# Patient Record
Sex: FEMALE | Race: OTHER | NOT HISPANIC OR LATINO
[De-identification: names, ages, dates, MRNs, and addresses within clinical notes are randomized per-mention and may not be internally consistent; named-entity substitution may affect disease eponyms.]

---

## 2019-04-17 ENCOUNTER — RESULT REVIEW (OUTPATIENT)
Age: 69
End: 2019-04-17

## 2021-01-25 ENCOUNTER — APPOINTMENT (OUTPATIENT)
Dept: OPHTHALMOLOGY | Facility: CLINIC | Age: 71
End: 2021-01-25

## 2021-01-25 ENCOUNTER — NON-APPOINTMENT (OUTPATIENT)
Age: 71
End: 2021-01-25

## 2021-06-01 ENCOUNTER — NON-APPOINTMENT (OUTPATIENT)
Age: 71
End: 2021-06-01

## 2021-06-01 ENCOUNTER — APPOINTMENT (OUTPATIENT)
Dept: OPHTHALMOLOGY | Facility: CLINIC | Age: 71
End: 2021-06-01

## 2021-08-03 ENCOUNTER — NON-APPOINTMENT (OUTPATIENT)
Age: 71
End: 2021-08-03

## 2021-08-03 ENCOUNTER — APPOINTMENT (OUTPATIENT)
Dept: OPHTHALMOLOGY | Facility: CLINIC | Age: 71
End: 2021-08-03

## 2021-11-16 ENCOUNTER — APPOINTMENT (OUTPATIENT)
Dept: OPHTHALMOLOGY | Facility: CLINIC | Age: 71
End: 2021-11-16

## 2021-11-16 ENCOUNTER — NON-APPOINTMENT (OUTPATIENT)
Age: 71
End: 2021-11-16

## 2022-03-09 ENCOUNTER — APPOINTMENT (OUTPATIENT)
Dept: OPHTHALMOLOGY | Facility: CLINIC | Age: 72
End: 2022-03-09

## 2022-03-15 ENCOUNTER — APPOINTMENT (OUTPATIENT)
Dept: OPHTHALMOLOGY | Facility: CLINIC | Age: 72
End: 2022-03-15

## 2022-03-15 ENCOUNTER — OUTPATIENT (OUTPATIENT)
Dept: OUTPATIENT SERVICES | Facility: HOSPITAL | Age: 72
LOS: 1 days | End: 2022-03-15

## 2022-03-15 ENCOUNTER — NON-APPOINTMENT (OUTPATIENT)
Age: 72
End: 2022-03-15

## 2022-03-16 DIAGNOSIS — H26.9 UNSPECIFIED CATARACT: ICD-10-CM

## 2022-03-29 ENCOUNTER — APPOINTMENT (OUTPATIENT)
Dept: OPHTHALMOLOGY | Facility: CLINIC | Age: 72
End: 2022-03-29

## 2022-03-29 ENCOUNTER — NON-APPOINTMENT (OUTPATIENT)
Age: 72
End: 2022-03-29

## 2022-04-12 NOTE — ASU PATIENT PROFILE, ADULT - FALL HARM RISK - UNIVERSAL INTERVENTIONS
Bed in lowest position, wheels locked, appropriate side rails in place/Call bell, personal items and telephone in reach/Instruct patient to call for assistance before getting out of bed or chair/Non-slip footwear when patient is out of bed/[place] to call system/Physically safe environment - no spills, clutter or unnecessary equipment/Purposeful Proactive Rounding/Room/bathroom lighting operational, light cord in reach

## 2022-04-13 ENCOUNTER — TRANSCRIPTION ENCOUNTER (OUTPATIENT)
Age: 72
End: 2022-04-13

## 2022-04-13 ENCOUNTER — NON-APPOINTMENT (OUTPATIENT)
Age: 72
End: 2022-04-13

## 2022-04-13 ENCOUNTER — OUTPATIENT (OUTPATIENT)
Dept: OUTPATIENT SERVICES | Facility: HOSPITAL | Age: 72
LOS: 1 days | Discharge: HOME | End: 2022-04-13

## 2022-04-13 ENCOUNTER — APPOINTMENT (OUTPATIENT)
Dept: OPHTHALMOLOGY | Facility: AMBULATORY SURGERY CENTER | Age: 72
End: 2022-04-13

## 2022-04-13 VITALS
HEIGHT: 63 IN | HEART RATE: 70 BPM | TEMPERATURE: 98 F | SYSTOLIC BLOOD PRESSURE: 160 MMHG | OXYGEN SATURATION: 99 % | RESPIRATION RATE: 16 BRPM | WEIGHT: 155.21 LBS | DIASTOLIC BLOOD PRESSURE: 57 MMHG

## 2022-04-13 VITALS
RESPIRATION RATE: 16 BRPM | SYSTOLIC BLOOD PRESSURE: 139 MMHG | TEMPERATURE: 98 F | HEART RATE: 69 BPM | DIASTOLIC BLOOD PRESSURE: 62 MMHG | OXYGEN SATURATION: 97 %

## 2022-04-13 DIAGNOSIS — Z98.890 OTHER SPECIFIED POSTPROCEDURAL STATES: Chronic | ICD-10-CM

## 2022-04-13 DIAGNOSIS — N60.02 SOLITARY CYST OF LEFT BREAST: Chronic | ICD-10-CM

## 2022-04-13 DEVICE — LENS IOL ACRYSOF SN60WF 20.5D
Type: IMPLANTABLE DEVICE | Site: LEFT | Status: NON-FUNCTIONAL
Removed: 2022-04-13

## 2022-04-13 RX ORDER — TROPICAMIDE 1 %
1 DROPS OPHTHALMIC (EYE)
Refills: 0 | Status: COMPLETED | OUTPATIENT
Start: 2022-04-13 | End: 2022-04-13

## 2022-04-13 RX ORDER — PHENYLEPHRINE HCL 2.5 %
1 DROPS OPHTHALMIC (EYE)
Refills: 0 | Status: COMPLETED | OUTPATIENT
Start: 2022-04-13 | End: 2022-04-13

## 2022-04-13 RX ORDER — ACETAMINOPHEN 500 MG
650 TABLET ORAL ONCE
Refills: 0 | Status: DISCONTINUED | OUTPATIENT
Start: 2022-04-13 | End: 2022-04-13

## 2022-04-13 RX ORDER — CYCLOPENTOLATE HYDROCHLORIDE 10 MG/ML
1 SOLUTION/ DROPS OPHTHALMIC
Refills: 0 | Status: COMPLETED | OUTPATIENT
Start: 2022-04-13 | End: 2022-04-13

## 2022-04-13 RX ORDER — OFLOXACIN 0.3 %
1 DROPS OPHTHALMIC (EYE)
Refills: 0 | Status: COMPLETED | OUTPATIENT
Start: 2022-04-13 | End: 2022-04-13

## 2022-04-13 RX ADMIN — Medication 1 DROP(S): at 09:41

## 2022-04-13 RX ADMIN — Medication 1 DROP(S): at 09:39

## 2022-04-13 RX ADMIN — Medication 1 DROP(S): at 09:15

## 2022-04-13 RX ADMIN — Medication 1 DROP(S): at 09:16

## 2022-04-13 RX ADMIN — Medication 1 DROP(S): at 09:14

## 2022-04-13 RX ADMIN — CYCLOPENTOLATE HYDROCHLORIDE 1 DROP(S): 10 SOLUTION/ DROPS OPHTHALMIC at 09:38

## 2022-04-13 RX ADMIN — Medication 1 DROP(S): at 09:40

## 2022-04-13 RX ADMIN — CYCLOPENTOLATE HYDROCHLORIDE 1 DROP(S): 10 SOLUTION/ DROPS OPHTHALMIC at 09:40

## 2022-04-13 RX ADMIN — CYCLOPENTOLATE HYDROCHLORIDE 1 DROP(S): 10 SOLUTION/ DROPS OPHTHALMIC at 09:15

## 2022-04-13 NOTE — ASU DISCHARGE PLAN (ADULT/PEDIATRIC) - NS MD DC FALL RISK RISK
For information on Fall & Injury Prevention, visit: [email or web address] OR  [email or web address] OR  https://www.cdc.gov/steadi/patient.html

## 2022-04-13 NOTE — OPERATIVE REPORT - OPERATIVE RPOSRT DETAILS
DATE: 4/13/2022    ATTENDING: Steffi Kellogg MD    PREOPERATIVE DIAGNOSIS(ES):  Cataract, Left eye.    POSTOPERATIVE DIAGNOSIS(ES):  Cataract, Left eye.    OPERATION:  Phacoemulsification with posterior chamber lens implant, SN60 WF +20.5 D Left eye.    COMPLICATIONS:  None.    ESTIMATED BLOOD LOSS: <1 cc    SPECIMEN/TISSUE REMOVED: None    DESCRIPTION OF PROCEDURE:  The patient was brought to the operating room and the patient was prepped and draped in the usual sterile fashion, including Betadine drops in the eye.  A drop of topical anesthetic was placed in the eye.  Steridrapes were used to cover the lashes and a  lid speculum was placed between the lids.  A superblade and Pratt ring were used to create paracenteses at the 3 and 9 oclock positions.  Intracameral preservative free lidocaine and epinephrine were instilled followed by a Viscoat.  A clear cornea superior incision was created using a 2.6mm metal bladed keratome.  A continuous curvilinear capsulorhexis was started with a 27 prebent cystotome and completed with capsulorhexis forceps.  The lens was hydrodissected with BSS.  The lens was then phacoemulsified using a divide and conquer technique.  Residual cortical material was removed using automated irrigation and aspiration.  The capsular bag was reformed using a viscoelastic.  A ________ lens was inserted into the bag and the superior haptic was gently dunked beneath the anterior capsule using a straight sinsky hook and rotated into place.  Symmetric capsular bag fixation was confirmed.  The remaining viscoelastic was removed with automated irrigation and aspiration.  The wounds were hydrated and checked to be water tight. The eye was left at a normotensive pressure  The lid speculum was removed.  Tobradex drops and ointment were instilled in the eye.  The patient left the OR in stable condition having tolerated the procedure well.  I, Steffi Kellogg, was present throughout the case.

## 2022-04-14 ENCOUNTER — APPOINTMENT (OUTPATIENT)
Dept: OPHTHALMOLOGY | Facility: CLINIC | Age: 72
End: 2022-04-14

## 2022-04-14 ENCOUNTER — NON-APPOINTMENT (OUTPATIENT)
Age: 72
End: 2022-04-14

## 2022-04-21 PROBLEM — I10 ESSENTIAL (PRIMARY) HYPERTENSION: Chronic | Status: ACTIVE | Noted: 2022-04-13

## 2022-04-21 PROBLEM — M85.80 OTHER SPECIFIED DISORDERS OF BONE DENSITY AND STRUCTURE, UNSPECIFIED SITE: Chronic | Status: ACTIVE | Noted: 2022-04-13

## 2022-04-21 PROBLEM — E78.5 HYPERLIPIDEMIA, UNSPECIFIED: Chronic | Status: ACTIVE | Noted: 2022-04-13

## 2022-04-21 PROBLEM — J30.2 OTHER SEASONAL ALLERGIC RHINITIS: Chronic | Status: ACTIVE | Noted: 2022-04-13

## 2022-04-25 RX ORDER — SODIUM CHLORIDE 9 MG/ML
1000 INJECTION, SOLUTION INTRAVENOUS
Refills: 0 | Status: DISCONTINUED | OUTPATIENT
Start: 2022-04-26 | End: 2022-04-26

## 2022-04-25 NOTE — ASU PATIENT PROFILE, ADULT - NSICDXPASTMEDICALHX_GEN_ALL_CORE_FT
no sig carter/std
PAST MEDICAL HISTORY:  Hyperlipidemia     Hypertension     Osteopenia     Seasonal allergies

## 2022-04-25 NOTE — ASU PATIENT PROFILE, ADULT - FALL HARM RISK - UNIVERSAL INTERVENTIONS
Bed in lowest position, wheels locked, appropriate side rails in place/Call bell, personal items and telephone in reach/Instruct patient to call for assistance before getting out of bed or chair/Non-slip footwear when patient is out of bed/[place] to call system/Physically safe environment - no spills, clutter or unnecessary equipment/Purposeful Proactive Rounding/Room/bathroom lighting operational, light cord in reach

## 2022-04-26 ENCOUNTER — TRANSCRIPTION ENCOUNTER (OUTPATIENT)
Age: 72
End: 2022-04-26

## 2022-04-26 ENCOUNTER — NON-APPOINTMENT (OUTPATIENT)
Age: 72
End: 2022-04-26

## 2022-04-26 ENCOUNTER — OUTPATIENT (OUTPATIENT)
Dept: OUTPATIENT SERVICES | Facility: HOSPITAL | Age: 72
LOS: 1 days | Discharge: HOME | End: 2022-04-26

## 2022-04-26 ENCOUNTER — APPOINTMENT (OUTPATIENT)
Dept: OPHTHALMOLOGY | Facility: AMBULATORY SURGERY CENTER | Age: 72
End: 2022-04-26

## 2022-04-26 VITALS
TEMPERATURE: 98 F | HEART RATE: 73 BPM | RESPIRATION RATE: 16 BRPM | SYSTOLIC BLOOD PRESSURE: 131 MMHG | OXYGEN SATURATION: 99 % | DIASTOLIC BLOOD PRESSURE: 62 MMHG

## 2022-04-26 VITALS
OXYGEN SATURATION: 99 % | SYSTOLIC BLOOD PRESSURE: 168 MMHG | HEIGHT: 63 IN | HEART RATE: 69 BPM | RESPIRATION RATE: 16 BRPM | TEMPERATURE: 98 F | DIASTOLIC BLOOD PRESSURE: 74 MMHG | WEIGHT: 154.32 LBS

## 2022-04-26 DIAGNOSIS — Z98.890 OTHER SPECIFIED POSTPROCEDURAL STATES: Chronic | ICD-10-CM

## 2022-04-26 DIAGNOSIS — N60.02 SOLITARY CYST OF LEFT BREAST: Chronic | ICD-10-CM

## 2022-04-26 DEVICE — LENS IOL ACRYSOF SN60WF 22.0D
Type: IMPLANTABLE DEVICE | Site: RIGHT | Status: NON-FUNCTIONAL
Removed: 2022-04-26

## 2022-04-26 RX ORDER — ATORVASTATIN CALCIUM 80 MG/1
1 TABLET, FILM COATED ORAL
Qty: 0 | Refills: 0 | DISCHARGE

## 2022-04-26 RX ORDER — TROPICAMIDE 1 %
1 DROPS OPHTHALMIC (EYE)
Refills: 0 | Status: COMPLETED | OUTPATIENT
Start: 2022-04-26 | End: 2022-04-26

## 2022-04-26 RX ORDER — LOSARTAN POTASSIUM 100 MG/1
1 TABLET, FILM COATED ORAL
Qty: 0 | Refills: 0 | DISCHARGE

## 2022-04-26 RX ORDER — CYCLOPENTOLATE HYDROCHLORIDE 10 MG/ML
1 SOLUTION/ DROPS OPHTHALMIC
Refills: 0 | Status: COMPLETED | OUTPATIENT
Start: 2022-04-26 | End: 2022-04-26

## 2022-04-26 RX ORDER — ACETAMINOPHEN 500 MG
650 TABLET ORAL ONCE
Refills: 0 | Status: DISCONTINUED | OUTPATIENT
Start: 2022-04-26 | End: 2022-04-26

## 2022-04-26 RX ORDER — PHENYLEPHRINE HCL 2.5 %
1 DROPS OPHTHALMIC (EYE)
Refills: 0 | Status: COMPLETED | OUTPATIENT
Start: 2022-04-26 | End: 2022-04-26

## 2022-04-26 RX ORDER — OFLOXACIN 0.3 %
1 DROPS OPHTHALMIC (EYE)
Refills: 0 | Status: COMPLETED | OUTPATIENT
Start: 2022-04-26 | End: 2022-04-26

## 2022-04-26 RX ADMIN — Medication 1 DROP(S): at 08:00

## 2022-04-26 RX ADMIN — CYCLOPENTOLATE HYDROCHLORIDE 1 DROP(S): 10 SOLUTION/ DROPS OPHTHALMIC at 08:03

## 2022-04-26 RX ADMIN — Medication 1 DROP(S): at 08:16

## 2022-04-26 RX ADMIN — Medication 1 DROP(S): at 07:53

## 2022-04-26 RX ADMIN — Medication 1 DROP(S): at 08:15

## 2022-04-26 RX ADMIN — CYCLOPENTOLATE HYDROCHLORIDE 1 DROP(S): 10 SOLUTION/ DROPS OPHTHALMIC at 07:54

## 2022-04-26 RX ADMIN — CYCLOPENTOLATE HYDROCHLORIDE 1 DROP(S): 10 SOLUTION/ DROPS OPHTHALMIC at 08:15

## 2022-04-26 RX ADMIN — Medication 1 DROP(S): at 08:03

## 2022-04-26 RX ADMIN — Medication 1 DROP(S): at 07:54

## 2022-04-26 NOTE — ASU PREOP CHECKLIST - AS BP NONINV METHOD
electronic

## 2022-04-26 NOTE — ASU DISCHARGE PLAN (ADULT/PEDIATRIC) - NS MD DC FALL RISK RISK
For information on Fall & Injury Prevention, visit: [email or web address] OR  [email or web address] OR  https://www.cdc.gov/steadi/patient.html

## 2022-04-26 NOTE — OPERATIVE REPORT - OPERATIVE RPOSRT DETAILS
DATE: April 26, 2022    ATTENDING: Steffi Kellogg MD    PREOPERATIVE DIAGNOSIS(ES):  Cataract, Right eye.    POSTOPERATIVE DIAGNOSIS(ES):  Cataract, Right eye.    OPERATION:  Phacoemulsification with posterior chamber lens implant, SN60WF +22.0D Right eye.    COMPLICATIONS:  None.    ESTIMATED BLOOD LOSS: <1 cc    SPECIMEN/TISSUE REMOVED: None    DESCRIPTION OF PROCEDURE:  The patient was brought to the operating room and the patient was prepped and draped in the usual sterile fashion, including Betadine drops in the eye.  A drop of topical anesthetic was placed in the eye.  Steridrapes were used to cover the lashes and a  lid speculum was placed between the lids.  A superblade and Pratt ring were used to create paracenteses at the 3 and 9 oclock positions.  Intracameral preservative free lidocaine and epinephrine were instilled followed by a Viscoat.  A clear cornea superior incision was created using a 2.6mm metal bladed keratome.  A continuous curvilinear capsulorhexis was started with a 27 prebent cystotome and completed with capsulorhexis forceps.  The lens was hydrodissected with BSS.  The lens was then phacoemulsified using a divide and conquer technique.  Residual cortical material was removed using automated irrigation and aspiration.  The capsular bag was reformed using a viscoelastic.  An SN60WF +22.0 D lens was inserted into the bag and the superior haptic was gently dunked beneath the anterior capsule using a straight sinsky hook and rotated into place.  Symmetric capsular bag fixation was confirmed.  The remaining viscoelastic was removed with automated irrigation and aspiration.  The wounds were hydrated and checked to be water tight. The eye was left at a normotensive pressure  The lid speculum was removed.  Tobradex drops and ointment were instilled in the eye.  The patient left the OR in stable condition having tolerated the procedure well.  ISteffi, was present throughout the case.

## 2022-04-27 ENCOUNTER — NON-APPOINTMENT (OUTPATIENT)
Age: 72
End: 2022-04-27

## 2022-04-27 ENCOUNTER — APPOINTMENT (OUTPATIENT)
Dept: OPHTHALMOLOGY | Facility: CLINIC | Age: 72
End: 2022-04-27

## 2022-05-18 ENCOUNTER — NON-APPOINTMENT (OUTPATIENT)
Age: 72
End: 2022-05-18

## 2022-05-18 ENCOUNTER — APPOINTMENT (OUTPATIENT)
Dept: OPHTHALMOLOGY | Facility: CLINIC | Age: 72
End: 2022-05-18

## 2022-08-23 ENCOUNTER — APPOINTMENT (OUTPATIENT)
Dept: OPHTHALMOLOGY | Facility: CLINIC | Age: 72
End: 2022-08-23

## 2022-11-10 ENCOUNTER — APPOINTMENT (OUTPATIENT)
Dept: OPHTHALMOLOGY | Facility: CLINIC | Age: 72
End: 2022-11-10

## 2022-11-10 ENCOUNTER — NON-APPOINTMENT (OUTPATIENT)
Age: 72
End: 2022-11-10

## 2022-11-11 NOTE — ASU PATIENT PROFILE, ADULT - NPO AFTER
A user error has taken place: encounter opened in error, closed for administrative reasons.       00:00

## 2022-12-09 ENCOUNTER — APPOINTMENT (OUTPATIENT)
Dept: ORTHOPEDIC SURGERY | Facility: CLINIC | Age: 72
End: 2022-12-09

## 2022-12-09 VITALS — WEIGHT: 145 LBS | HEIGHT: 63.5 IN | BODY MASS INDEX: 25.37 KG/M2

## 2022-12-09 DIAGNOSIS — Z78.9 OTHER SPECIFIED HEALTH STATUS: ICD-10-CM

## 2022-12-09 DIAGNOSIS — Z00.00 ENCOUNTER FOR GENERAL ADULT MEDICAL EXAMINATION W/OUT ABNORMAL FINDINGS: ICD-10-CM

## 2022-12-09 DIAGNOSIS — M17.12 UNILATERAL PRIMARY OSTEOARTHRITIS, LEFT KNEE: ICD-10-CM

## 2022-12-09 DIAGNOSIS — E78.00 PURE HYPERCHOLESTEROLEMIA, UNSPECIFIED: ICD-10-CM

## 2022-12-09 DIAGNOSIS — I10 ESSENTIAL (PRIMARY) HYPERTENSION: ICD-10-CM

## 2022-12-09 PROCEDURE — 73564 X-RAY EXAM KNEE 4 OR MORE: CPT | Mod: LT

## 2022-12-09 PROCEDURE — 20611 DRAIN/INJ JOINT/BURSA W/US: CPT | Mod: LT

## 2022-12-09 PROCEDURE — 99203 OFFICE O/P NEW LOW 30 MIN: CPT | Mod: 25

## 2022-12-09 NOTE — DISCUSSION/SUMMARY
[de-identified] : Patient allowed to gently start resuming activities.\par Discussed change to medication prescription and usage. \par Offered cortisone steroid injection. \par Bracing options discussed with patient. \par Hyaluronic Acid inj pamphlet given to pt.\par 12/09/2022 \par \par  RE:  DORIE BECKER \par \par Acct #- [id number] \par \par \par Attention:  Nurse Reviewer /Medical Director\par \par I am writing this letter as a medical necessity for PT program.\par Patient has tried analgesics, non-steroid anti-inflammatory agents, \par hot or cold compresses,injections of corticosteroids, etc)  which in combination or by themselves has not worked.\par Based on my patient's condition, I strongly believe that the PT is medically needed.\par  \par Thank you for your time and consideration.   \par \par \par

## 2022-12-09 NOTE — HISTORY OF PRESENT ILLNESS
[Gradual] : gradual [6] : 6 [Radiating] : radiating [Walking] : walking [Stairs] : stairs [Retired] : Work status: retired [de-identified] : one month, no injury, did walk a lot of stairs, tried ES Tylenol, no swleling,worse when active [] : no [FreeTextEntry1] : left knee [FreeTextEntry5] : pain for 5 weeks, no injury [FreeTextEntry7] : knee/ ankle [FreeTextEntry9] : knee support havent really worked, ES Tylenol [de-identified] : being in feet for a period of time [de-identified] : 10 years ago

## 2022-12-09 NOTE — PHYSICAL EXAM
[] : mildly antalgic [Left] : left knee [All Views] : anteroposterior, lateral, skyline, and anteroposterior standing [Mild tricompartmental OA medial narrowing] : Mild tricompartmental OA medial narrowing [TWNoteComboBox7] : flexion 125 degrees

## 2023-03-22 ENCOUNTER — NON-APPOINTMENT (OUTPATIENT)
Age: 73
End: 2023-03-22

## 2023-03-22 ENCOUNTER — APPOINTMENT (OUTPATIENT)
Dept: OPHTHALMOLOGY | Facility: CLINIC | Age: 73
End: 2023-03-22

## 2023-09-05 ENCOUNTER — APPOINTMENT (OUTPATIENT)
Dept: OPHTHALMOLOGY | Facility: CLINIC | Age: 73
End: 2023-09-05

## 2023-09-05 ENCOUNTER — NON-APPOINTMENT (OUTPATIENT)
Age: 73
End: 2023-09-05

## 2023-11-09 ENCOUNTER — APPOINTMENT (OUTPATIENT)
Dept: OPHTHALMOLOGY | Facility: CLINIC | Age: 73
End: 2023-11-09

## 2023-11-09 ENCOUNTER — NON-APPOINTMENT (OUTPATIENT)
Age: 73
End: 2023-11-09

## 2023-11-09 ENCOUNTER — APPOINTMENT (OUTPATIENT)
Dept: OPHTHALMOLOGY | Facility: CLINIC | Age: 73
End: 2023-11-09
Payer: MEDICARE

## 2023-11-09 PROCEDURE — 92134 CPTRZ OPH DX IMG PST SGM RTA: CPT

## 2023-11-09 PROCEDURE — 67145 PROPH RTA DTCHMNT PC: CPT | Mod: LT

## 2023-11-09 PROCEDURE — 92004 COMPRE OPH EXAM NEW PT 1/>: CPT | Mod: 25

## 2023-11-13 ENCOUNTER — APPOINTMENT (OUTPATIENT)
Dept: OPHTHALMOLOGY | Facility: CLINIC | Age: 73
End: 2023-11-13
Payer: MEDICARE

## 2023-11-13 ENCOUNTER — NON-APPOINTMENT (OUTPATIENT)
Age: 73
End: 2023-11-13

## 2023-11-13 PROCEDURE — 99024 POSTOP FOLLOW-UP VISIT: CPT

## 2023-11-13 PROCEDURE — 67028 INJECTION EYE DRUG: CPT | Mod: 79,LT

## 2023-11-13 PROCEDURE — 92134 CPTRZ OPH DX IMG PST SGM RTA: CPT

## 2023-11-14 ENCOUNTER — TRANSCRIPTION ENCOUNTER (OUTPATIENT)
Age: 73
End: 2023-11-14

## 2023-12-11 ENCOUNTER — APPOINTMENT (OUTPATIENT)
Dept: OPHTHALMOLOGY | Facility: CLINIC | Age: 73
End: 2023-12-11
Payer: MEDICARE

## 2023-12-11 ENCOUNTER — NON-APPOINTMENT (OUTPATIENT)
Age: 73
End: 2023-12-11

## 2023-12-11 PROCEDURE — 67028 INJECTION EYE DRUG: CPT | Mod: LT

## 2023-12-11 PROCEDURE — 92134 CPTRZ OPH DX IMG PST SGM RTA: CPT

## 2024-01-08 ENCOUNTER — NON-APPOINTMENT (OUTPATIENT)
Age: 74
End: 2024-01-08

## 2024-01-08 ENCOUNTER — APPOINTMENT (OUTPATIENT)
Dept: OPHTHALMOLOGY | Facility: CLINIC | Age: 74
End: 2024-01-08
Payer: MEDICARE

## 2024-01-08 PROCEDURE — 92012 INTRM OPH EXAM EST PATIENT: CPT | Mod: 25

## 2024-01-08 PROCEDURE — 92134 CPTRZ OPH DX IMG PST SGM RTA: CPT

## 2024-01-08 PROCEDURE — 67028 INJECTION EYE DRUG: CPT | Mod: LT

## 2024-01-29 ENCOUNTER — APPOINTMENT (OUTPATIENT)
Dept: OPHTHALMOLOGY | Facility: CLINIC | Age: 74
End: 2024-01-29

## 2024-01-29 ENCOUNTER — NON-APPOINTMENT (OUTPATIENT)
Age: 74
End: 2024-01-29

## 2024-02-12 ENCOUNTER — APPOINTMENT (OUTPATIENT)
Dept: OPHTHALMOLOGY | Facility: CLINIC | Age: 74
End: 2024-02-12
Payer: MEDICARE

## 2024-02-12 ENCOUNTER — NON-APPOINTMENT (OUTPATIENT)
Age: 74
End: 2024-02-12

## 2024-02-12 PROCEDURE — 92134 CPTRZ OPH DX IMG PST SGM RTA: CPT

## 2024-02-12 PROCEDURE — 67028 INJECTION EYE DRUG: CPT | Mod: LT

## 2024-02-28 ENCOUNTER — APPOINTMENT (OUTPATIENT)
Dept: OPHTHALMOLOGY | Facility: CLINIC | Age: 74
End: 2024-02-28

## 2024-02-28 ENCOUNTER — NON-APPOINTMENT (OUTPATIENT)
Age: 74
End: 2024-02-28

## 2024-03-11 ENCOUNTER — APPOINTMENT (OUTPATIENT)
Dept: OPHTHALMOLOGY | Facility: CLINIC | Age: 74
End: 2024-03-11
Payer: MEDICARE

## 2024-03-11 ENCOUNTER — NON-APPOINTMENT (OUTPATIENT)
Age: 74
End: 2024-03-11

## 2024-03-11 PROCEDURE — 67028 INJECTION EYE DRUG: CPT | Mod: LT

## 2024-03-11 PROCEDURE — 92134 CPTRZ OPH DX IMG PST SGM RTA: CPT

## 2024-04-10 ENCOUNTER — APPOINTMENT (OUTPATIENT)
Dept: OPHTHALMOLOGY | Facility: CLINIC | Age: 74
End: 2024-04-10
Payer: MEDICARE

## 2024-04-10 ENCOUNTER — NON-APPOINTMENT (OUTPATIENT)
Age: 74
End: 2024-04-10

## 2024-04-10 PROCEDURE — 67028 INJECTION EYE DRUG: CPT | Mod: LT

## 2024-04-10 PROCEDURE — 92012 INTRM OPH EXAM EST PATIENT: CPT | Mod: 25

## 2024-05-08 ENCOUNTER — APPOINTMENT (OUTPATIENT)
Dept: OPHTHALMOLOGY | Facility: CLINIC | Age: 74
End: 2024-05-08
Payer: MEDICARE

## 2024-05-08 ENCOUNTER — NON-APPOINTMENT (OUTPATIENT)
Age: 74
End: 2024-05-08

## 2024-05-08 PROCEDURE — 67028 INJECTION EYE DRUG: CPT | Mod: LT

## 2024-05-08 PROCEDURE — 92134 CPTRZ OPH DX IMG PST SGM RTA: CPT

## 2024-06-13 ENCOUNTER — APPOINTMENT (OUTPATIENT)
Dept: OPHTHALMOLOGY | Facility: CLINIC | Age: 74
End: 2024-06-13

## 2024-06-13 ENCOUNTER — NON-APPOINTMENT (OUTPATIENT)
Age: 74
End: 2024-06-13

## 2024-06-19 ENCOUNTER — APPOINTMENT (OUTPATIENT)
Dept: OPHTHALMOLOGY | Facility: CLINIC | Age: 74
End: 2024-06-19
Payer: MEDICARE

## 2024-06-19 ENCOUNTER — NON-APPOINTMENT (OUTPATIENT)
Age: 74
End: 2024-06-19

## 2024-06-19 PROCEDURE — 67028 INJECTION EYE DRUG: CPT | Mod: LT

## 2024-06-19 PROCEDURE — 92012 INTRM OPH EXAM EST PATIENT: CPT | Mod: 25

## 2024-06-19 PROCEDURE — 92134 CPTRZ OPH DX IMG PST SGM RTA: CPT

## 2024-07-24 ENCOUNTER — APPOINTMENT (OUTPATIENT)
Dept: OPHTHALMOLOGY | Facility: CLINIC | Age: 74
End: 2024-07-24
Payer: MEDICARE

## 2024-07-24 ENCOUNTER — NON-APPOINTMENT (OUTPATIENT)
Age: 74
End: 2024-07-24

## 2024-07-24 PROCEDURE — 67028 INJECTION EYE DRUG: CPT | Mod: LT

## 2024-07-24 PROCEDURE — 92134 CPTRZ OPH DX IMG PST SGM RTA: CPT

## 2024-08-29 ENCOUNTER — APPOINTMENT (OUTPATIENT)
Dept: OPHTHALMOLOGY | Facility: CLINIC | Age: 74
End: 2024-08-29
Payer: MEDICARE

## 2024-08-29 ENCOUNTER — NON-APPOINTMENT (OUTPATIENT)
Age: 74
End: 2024-08-29

## 2024-08-29 PROCEDURE — 67028 INJECTION EYE DRUG: CPT | Mod: LT

## 2024-08-29 PROCEDURE — 92134 CPTRZ OPH DX IMG PST SGM RTA: CPT

## 2024-08-29 PROCEDURE — 92012 INTRM OPH EXAM EST PATIENT: CPT | Mod: 25

## 2024-10-09 ENCOUNTER — APPOINTMENT (OUTPATIENT)
Dept: OPHTHALMOLOGY | Facility: CLINIC | Age: 74
End: 2024-10-09
Payer: MEDICARE

## 2024-10-09 ENCOUNTER — NON-APPOINTMENT (OUTPATIENT)
Age: 74
End: 2024-10-09

## 2024-10-09 PROCEDURE — 92012 INTRM OPH EXAM EST PATIENT: CPT | Mod: 25

## 2024-10-09 PROCEDURE — 67028 INJECTION EYE DRUG: CPT | Mod: LT

## 2024-10-09 PROCEDURE — 92134 CPTRZ OPH DX IMG PST SGM RTA: CPT

## 2024-11-25 ENCOUNTER — APPOINTMENT (OUTPATIENT)
Dept: OPHTHALMOLOGY | Facility: CLINIC | Age: 74
End: 2024-11-25
Payer: MEDICARE

## 2024-11-25 ENCOUNTER — NON-APPOINTMENT (OUTPATIENT)
Age: 74
End: 2024-11-25

## 2024-11-25 PROCEDURE — 67028 INJECTION EYE DRUG: CPT | Mod: LT

## 2024-11-25 PROCEDURE — 92134 CPTRZ OPH DX IMG PST SGM RTA: CPT

## 2024-12-13 ENCOUNTER — NON-APPOINTMENT (OUTPATIENT)
Age: 74
End: 2024-12-13

## 2024-12-13 ENCOUNTER — APPOINTMENT (OUTPATIENT)
Dept: OPHTHALMOLOGY | Facility: CLINIC | Age: 74
End: 2024-12-13

## 2024-12-18 ENCOUNTER — APPOINTMENT (OUTPATIENT)
Dept: OPHTHALMOLOGY | Facility: CLINIC | Age: 74
End: 2024-12-18

## 2024-12-18 ENCOUNTER — NON-APPOINTMENT (OUTPATIENT)
Age: 74
End: 2024-12-18

## 2025-01-17 ENCOUNTER — APPOINTMENT (OUTPATIENT)
Dept: OPHTHALMOLOGY | Facility: CLINIC | Age: 75
End: 2025-01-17
Payer: MEDICARE

## 2025-01-17 ENCOUNTER — NON-APPOINTMENT (OUTPATIENT)
Age: 75
End: 2025-01-17

## 2025-01-17 PROCEDURE — 67028 INJECTION EYE DRUG: CPT | Mod: LT

## 2025-01-17 PROCEDURE — 92012 INTRM OPH EXAM EST PATIENT: CPT | Mod: 25

## 2025-01-17 PROCEDURE — 92134 CPTRZ OPH DX IMG PST SGM RTA: CPT

## 2025-03-21 ENCOUNTER — APPOINTMENT (OUTPATIENT)
Dept: OPHTHALMOLOGY | Facility: CLINIC | Age: 75
End: 2025-03-21
Payer: MEDICARE

## 2025-03-21 ENCOUNTER — NON-APPOINTMENT (OUTPATIENT)
Age: 75
End: 2025-03-21

## 2025-03-21 PROCEDURE — 92134 CPTRZ OPH DX IMG PST SGM RTA: CPT

## 2025-03-21 PROCEDURE — 67028 INJECTION EYE DRUG: CPT | Mod: LT

## 2025-05-21 ENCOUNTER — NON-APPOINTMENT (OUTPATIENT)
Age: 75
End: 2025-05-21

## 2025-05-21 ENCOUNTER — APPOINTMENT (OUTPATIENT)
Dept: OPHTHALMOLOGY | Facility: CLINIC | Age: 75
End: 2025-05-21

## 2025-05-21 PROCEDURE — 67028 INJECTION EYE DRUG: CPT | Mod: LT

## 2025-05-21 PROCEDURE — 92012 INTRM OPH EXAM EST PATIENT: CPT | Mod: 25

## 2025-05-21 PROCEDURE — 92134 CPTRZ OPH DX IMG PST SGM RTA: CPT

## 2025-06-19 ENCOUNTER — APPOINTMENT (OUTPATIENT)
Dept: OPHTHALMOLOGY | Facility: CLINIC | Age: 75
End: 2025-06-19

## 2025-07-24 ENCOUNTER — APPOINTMENT (OUTPATIENT)
Dept: OPHTHALMOLOGY | Facility: CLINIC | Age: 75
End: 2025-07-24

## 2025-07-24 ENCOUNTER — NON-APPOINTMENT (OUTPATIENT)
Age: 75
End: 2025-07-24

## 2025-07-30 ENCOUNTER — APPOINTMENT (OUTPATIENT)
Dept: OPHTHALMOLOGY | Facility: CLINIC | Age: 75
End: 2025-07-30
Payer: MEDICARE

## 2025-07-30 ENCOUNTER — NON-APPOINTMENT (OUTPATIENT)
Age: 75
End: 2025-07-30

## 2025-07-30 PROCEDURE — 67028 INJECTION EYE DRUG: CPT | Mod: LT

## 2025-07-30 PROCEDURE — 92134 CPTRZ OPH DX IMG PST SGM RTA: CPT

## (undated) DEVICE — PACK CENTURION 2.75MM

## (undated) DEVICE — CANNULA IRR ALCON ANTERIOR CHAMBER 30G

## (undated) DEVICE — SOL IRR BAL SALT 500ML

## (undated) DEVICE — Device

## (undated) DEVICE — VENODYNE/SCD SLEEVE CALF MEDIUM

## (undated) DEVICE — KNIFE ALCON STANDARD FULL HANDLE 15 DEG (PINK)

## (undated) DEVICE — GLV 6.5 PROTEXIS (WHITE)

## (undated) DEVICE — APPLICATOR COTTON TIP 6"

## (undated) DEVICE — NUCLEUS HYDRODISSECTOR PEARCE ANGLED 25G X 22MM

## (undated) DEVICE — DRAPE MICROSCOPE KNOB COVER SMALL (2 PCS)

## (undated) DEVICE — SUT NYLON 10-0 12" CU-5

## (undated) DEVICE — KNIFE ALCON SLIT CLEARCUT SATIN SINGLE BEVEL 2.6MM (PINK)

## (undated) DEVICE — DRSG CURITY GAUZE SPONGE 4 X 4" 12-PLY

## (undated) DEVICE — SYR LUER LOK 3CC

## (undated) DEVICE — WARMING BLANKET LOWER ADULT

## (undated) DEVICE — PACK ANTERIOR SEGMENT

## (undated) DEVICE — DRSG TAPE MICROPORE SURGICAL TAPE .5"X10 YDS TAN

## (undated) DEVICE — KIT CENTURION ANTERIOR

## (undated) DEVICE — APPLICATOR COTTON TIP 3" STERILE